# Patient Record
Sex: FEMALE | Race: WHITE | URBAN - METROPOLITAN AREA
[De-identification: names, ages, dates, MRNs, and addresses within clinical notes are randomized per-mention and may not be internally consistent; named-entity substitution may affect disease eponyms.]

---

## 2018-03-19 ENCOUNTER — HOSPITAL ENCOUNTER (OUTPATIENT)
Dept: GENERAL RADIOLOGY | Facility: CLINIC | Age: 33
Discharge: HOME | End: 2018-03-19
Attending: OBSTETRICS & GYNECOLOGY | Admitting: OBSTETRICS & GYNECOLOGY
Payer: COMMERCIAL

## 2018-03-19 DIAGNOSIS — Z31.41 FERTILITY TESTING: ICD-10-CM

## 2018-03-19 PROCEDURE — 25500064 ZZH RX 255 OP 636: Performed by: RADIOLOGY

## 2018-03-19 PROCEDURE — 58340 CATHETER FOR HYSTEROGRAPHY: CPT

## 2018-03-19 RX ORDER — IOPAMIDOL 510 MG/ML
50 INJECTION, SOLUTION INTRAVASCULAR ONCE
Status: COMPLETED | OUTPATIENT
Start: 2018-03-19 | End: 2018-03-19

## 2018-03-19 RX ADMIN — IOPAMIDOL 5 ML: 510 INJECTION, SOLUTION INTRAVASCULAR at 08:48

## 2018-03-19 NOTE — OP NOTE
Procedure Date: 2018      DATE OF PROCEDURE:  2018      PREOPERATIVE DIAGNOSIS:  Fertility testing.      POSTOPERATIVE DIAGNOSIS:  Fertility testing.      PROCEDURE:  Hysterosalpingogram.      PROCEDURE COURSE:  The patient was placed on the table.  A speculum was placed in the vagina to visualize the cervix.  Cervix was cleansed with Betadine.  Anterior lip was grasped with a tenaculum and articulated with the injector.  The HSG was performed under fluoroscopy which preliminary reading was a normal uterine cavity with bilateral fill and spill of the tubes.  The patient tolerated the procedure well and there was no blood loss.         MORTEZA DURAN MD             D: 2018   T: 2018   MT: CC      Name:     Alisha Card   MRN:      [id number]        Account:        [id number]   :                 Procedure Date: 2018      Document: [id number]       cc: Morteza Duran MD

## 2018-03-19 NOTE — IP AVS SNAPSHOT
"                  MRN:[id number]                      After Visit Summary   3/19/2018    Mrs. Alisha Card    MRN: [id number]           Visit Information        Provider Department      3/19/2018  8:00 AM [facility] RAD; [facility]           Review of your medicines      Notice     You have not been prescribed any medications.             Protect others around you: Learn how to safely use, store and throw away your medicines at www.disposemymeds.org.         Follow-ups after your visit         Care Instructions        Further instructions from your care team         HSG (Hysterosalpingogram) Discharge Instructions    Diet and medicines:    You may go back to your normal diet and medicines.    You may take Tylenol (acetaminophen) or Advil (ibuprofen).    Your doctor has prescribed: ___________________________________    Activity: You may go back to your normal routine.    Side effects:    Expect mild cramping today.    You may have bloody spotting or brown, sticky discharge for up to two days.    Other instructions:     Call your doctor if you have:    Hives.    Problems breathing.    Swelling.    Signs of infection, which include:    A fever over 101  F (38.3  C), taken under the tongue.    Unusual discharge from your vagina.    Pain in lower abdomen (belly).    Questions?   Call your hospital:   [facility] [phone]    Patient: ____________________________________    Instructor: __________________________________   Time: ________ Date: ____________     Additional Information About Your Visit        MyChart Information     [employer] lets you send messages to your doctor, view your test results, renew your prescriptions, schedule appointments and more. To sign up, go to [email or web address] . Click on \"Log in\" on the left side of the screen, which will take you to the Welcome page. Then click on \"Sign up Now\" on the right side of the page.     You will be asked to enter " the access code listed below, as well as some personal information. Please follow the directions to create your username and password.     Your access code is: [email or web address]  Expires: 2018  8:33 AM     Your access code will  in 90 days. If you need help or a new code, please call your [facility] clinic or [phone].        Care EveryWhere ID     This is your Care EveryWhere ID. This could be used by other organizations to access your [facility] medical records  [id number]         Primary Care Provider Office Phone # Fax #    Morteza Cameron Duran [phone]      Equal Access to Services     [facility] : Hadii otto Cole, waangela flanagan, qacarmela funesmaosman horne, maria eugenia venegas . So [facility] [phone].    ATENCIÓN: Si habla español, tiene a richmond disposición servicios gratuitos de asistencia lingüística. Llame al [phone].    We comply with applicable federal civil rights laws and [place] laws. We do not discriminate on the basis of race, color, national origin, age, disability, sex, sexual orientation, or gender identity.            Thank you!     Thank you for choosing [facility] for your care. Our goal is always to provide you with excellent care. Hearing back from our patients is one way we can continue to improve our services. Please take a few minutes to complete the written survey that you may receive in the mail after you visit with us. Thank you!             Medication List: This is a list of all your medications and when to take them. Check marks below indicate your daily home schedule. Keep this list as a reference.      Notice     You have not been prescribed any medications.

## 2018-03-19 NOTE — IP AVS SNAPSHOT
[facility] Radiology    [address] [facility] [address]    Phone:  [phone]                                       After Visit Summary   3/19/2018    Mrs. Alisha Card    MRN: [id number]           After Visit Summary Signature Page     I have received my discharge instructions, and my questions have been answered. I have discussed any challenges I see with this plan with the nurse or doctor.    ..........................................................................................................................................  Patient/Patient Representative Signature      ..........................................................................................................................................  Patient Representative Print Name and Relationship to Patient    ..................................................               ................................................  Date                                            Time    ..........................................................................................................................................  Reviewed by Signature/Title    ...................................................              ..............................................  Date                                                            Time

## 2018-03-19 NOTE — DISCHARGE INSTRUCTIONS
HSG (Hysterosalpingogram) Discharge Instructions    Diet and medicines:    You may go back to your normal diet and medicines.    You may take Tylenol (acetaminophen) or Advil (ibuprofen).    Your doctor has prescribed: ___________________________________    Activity: You may go back to your normal routine.    Side effects:    Expect mild cramping today.    You may have bloody spotting or brown, sticky discharge for up to two days.    Other instructions:     Call your doctor if you have:    Hives.    Problems breathing.    Swelling.    Signs of infection, which include:    A fever over 101  F (38.3  C), taken under the tongue.    Unusual discharge from your vagina.    Pain in lower abdomen (belly).    Questions?   Call your hospital:   [facility] [phone]    Patient: ____________________________________    Instructor: __________________________________   Time: ________ Date: ____________